# Patient Record
Sex: FEMALE | Race: WHITE | NOT HISPANIC OR LATINO | Employment: UNEMPLOYED | URBAN - METROPOLITAN AREA
[De-identification: names, ages, dates, MRNs, and addresses within clinical notes are randomized per-mention and may not be internally consistent; named-entity substitution may affect disease eponyms.]

---

## 2023-10-18 DIAGNOSIS — O09.292 HX OF PRE-ECLAMPSIA IN PRIOR PREGNANCY, CURRENTLY PREGNANT, SECOND TRIMESTER: ICD-10-CM

## 2023-10-18 DIAGNOSIS — Z36.89 ENCOUNTER FOR FETAL ANATOMIC SURVEY: ICD-10-CM

## 2023-10-18 DIAGNOSIS — O09.292 SUPRVSN OF PREG W POOR REPRODCTV OR OBSTET HX, SECOND TRI: Primary | ICD-10-CM

## 2023-10-23 ENCOUNTER — PROCEDURE VISIT (OUTPATIENT)
Dept: MATERNAL FETAL MEDICINE | Facility: CLINIC | Age: 23
End: 2023-10-23
Payer: MEDICAID

## 2023-10-23 ENCOUNTER — OFFICE VISIT (OUTPATIENT)
Dept: MATERNAL FETAL MEDICINE | Facility: CLINIC | Age: 23
End: 2023-10-23
Payer: MEDICAID

## 2023-10-23 VITALS
DIASTOLIC BLOOD PRESSURE: 90 MMHG | BODY MASS INDEX: 30.63 KG/M2 | WEIGHT: 142 LBS | SYSTOLIC BLOOD PRESSURE: 135 MMHG | HEART RATE: 102 BPM | HEIGHT: 57 IN

## 2023-10-23 DIAGNOSIS — Z36.89 ENCOUNTER FOR FETAL ANATOMIC SURVEY: ICD-10-CM

## 2023-10-23 DIAGNOSIS — O09.292 SUPRVSN OF PREG W POOR REPRODCTV OR OBSTET HX, SECOND TRI: ICD-10-CM

## 2023-10-23 DIAGNOSIS — O99.212 OBESITY AFFECTING PREGNANCY IN SECOND TRIMESTER, UNSPECIFIED OBESITY TYPE: ICD-10-CM

## 2023-10-23 DIAGNOSIS — O09.292 HX OF PRE-ECLAMPSIA IN PRIOR PREGNANCY, CURRENTLY PREGNANT, SECOND TRIMESTER: ICD-10-CM

## 2023-10-23 DIAGNOSIS — O09.292 HX OF PREECLAMPSIA, PRIOR PREGNANCY, CURRENTLY PREGNANT, SECOND TRIMESTER: ICD-10-CM

## 2023-10-23 DIAGNOSIS — O09.292 HX OF INTRAUTERINE FETAL DEATH, CURRENTLY PREGNANT, SECOND TRIMESTER: ICD-10-CM

## 2023-10-23 DIAGNOSIS — O16.2 ELEVATED BLOOD PRESSURE COMPLICATING PREGNANCY IN SECOND TRIMESTER, ANTEPARTUM: ICD-10-CM

## 2023-10-23 DIAGNOSIS — O09.90 AT HIGH RISK FOR COMPLICATIONS OF INTRAUTERINE PREGNANCY (IUP): ICD-10-CM

## 2023-10-23 PROCEDURE — 3080F PR MOST RECENT DIASTOLIC BLOOD PRESSURE >= 90 MM HG: ICD-10-PCS | Mod: CPTII,S$GLB,, | Performed by: OBSTETRICS & GYNECOLOGY

## 2023-10-23 PROCEDURE — 3080F DIAST BP >= 90 MM HG: CPT | Mod: CPTII,S$GLB,, | Performed by: OBSTETRICS & GYNECOLOGY

## 2023-10-23 PROCEDURE — 76811 OB US DETAILED SNGL FETUS: CPT | Mod: S$GLB,,, | Performed by: OBSTETRICS & GYNECOLOGY

## 2023-10-23 PROCEDURE — 3008F PR BODY MASS INDEX (BMI) DOCUMENTED: ICD-10-PCS | Mod: CPTII,S$GLB,, | Performed by: OBSTETRICS & GYNECOLOGY

## 2023-10-23 PROCEDURE — 1159F PR MEDICATION LIST DOCUMENTED IN MEDICAL RECORD: ICD-10-PCS | Mod: CPTII,S$GLB,, | Performed by: OBSTETRICS & GYNECOLOGY

## 2023-10-23 PROCEDURE — 3075F SYST BP GE 130 - 139MM HG: CPT | Mod: CPTII,S$GLB,, | Performed by: OBSTETRICS & GYNECOLOGY

## 2023-10-23 PROCEDURE — 99203 PR OFFICE/OUTPT VISIT, NEW, LEVL III, 30-44 MIN: ICD-10-PCS | Mod: TH,S$GLB,, | Performed by: OBSTETRICS & GYNECOLOGY

## 2023-10-23 PROCEDURE — 3008F BODY MASS INDEX DOCD: CPT | Mod: CPTII,S$GLB,, | Performed by: OBSTETRICS & GYNECOLOGY

## 2023-10-23 PROCEDURE — 3075F PR MOST RECENT SYSTOLIC BLOOD PRESS GE 130-139MM HG: ICD-10-PCS | Mod: CPTII,S$GLB,, | Performed by: OBSTETRICS & GYNECOLOGY

## 2023-10-23 PROCEDURE — 99203 OFFICE O/P NEW LOW 30 MIN: CPT | Mod: TH,S$GLB,, | Performed by: OBSTETRICS & GYNECOLOGY

## 2023-10-23 PROCEDURE — 76811 PR US, OB FETAL EVAL & EXAM, TRANSABDOM,FIRST GESTATION: ICD-10-PCS | Mod: S$GLB,,, | Performed by: OBSTETRICS & GYNECOLOGY

## 2023-10-23 PROCEDURE — 1159F MED LIST DOCD IN RCRD: CPT | Mod: CPTII,S$GLB,, | Performed by: OBSTETRICS & GYNECOLOGY

## 2023-10-23 RX ORDER — ASPIRIN 81 MG/1
81 TABLET ORAL DAILY
COMMUNITY

## 2023-10-23 NOTE — PROGRESS NOTES
Maternal Fetal Medicine New Consult    Subjective     Patient ID: [id number]    Chief Complaint: MFM consult with US (H/o Fetal demise due to Preeclampsia.   )      HPI 23 y.o.  female  at 18w4d gestation with Estimated Date of Delivery: 3/21/24 by LMP, consistent with early US. She is sent for MFM consultation for history of fetal demise due to preeclampsia.  She is history of severe preeclampsia requiring delivery at 25 weeks via emergency .  Baby was born alive and she reports baby passed away in the NICU 10 days later.  Baby also had severe FGR, weighing 14 oz at 25 weeks.  She has increased BMI 30.7 at consult visit.  She is currently on prophylactic low-dose aspirin daily.  She denies any personal or family history of aneuploidy or anomalies. She denies any exposure to high fevers, viral rashes, illicit drugs or alcohol in this pregnancy.  She denies any leaking fluid, vaginal bleeding, contractions, decreased fetal movement. Denies headaches, visual disturbances, or epigastric pain.  Patient was accompanied by her mother Winnie.    Pregnancy complications include:   Patient Active Problem List   Diagnosis    Hx of severe preeclampsia with fetal growth restriction requiring delivery at 25 weeks with  demise, prior pregnancy, currently pregnant, second trimester    At high risk for complications of intrauterine pregnancy (IUP)    Increased BMI affecting pregnancy in second trimester    Elevated blood pressure-at 18 weeks, possible chronic hypertension, complicating pregnancy in second trimester, antepartum        Past Medical History:   Diagnosis Date    Hypertension     Gestational HTN  with Preeclampsia       Past Surgical History:   Procedure Laterality Date     SECTION         Family History   Problem Relation Age of Onset    Hypertension Paternal Grandfather     Hypertension Maternal Grandfather     Hypertension Father      labor Mother        Social History  "    Socioeconomic History    Marital status: Single   Tobacco Use    Smoking status: Never    Smokeless tobacco: Never   Substance and Sexual Activity    Alcohol use: Never    Drug use: Never    Sexual activity: Yes     Partners: Male       Current Outpatient Medications   Medication Sig Dispense Refill    aspirin (ECOTRIN) 81 MG EC tablet Take 81 mg by mouth once daily.      prenatal vit/iron fum/folic ac (PRENATAL 1+1 ORAL) Take by mouth.       No current facility-administered medications for this visit.       Review of patient's allergies indicates:   Allergen Reactions    Egg     Pcn [penicillins] Blisters       Medications:  Current Outpatient Medications   Medication Instructions    aspirin (ECOTRIN) 81 mg, Oral, Daily    prenatal vit/iron fum/folic ac (PRENATAL 1+1 ORAL) Oral       Review of Systems   12 point review of systems conducted, negative except as stated in the history of present illness. See HPI for details.      Objective     Visit Vitals  BP (!) 135/90 (BP Location: Left arm, Patient Position: Sitting, BP Method: Medium (Automatic))   Pulse 102   Ht 4' 9" (1.448 m)   Wt 64.4 kg (142 lb)   BMI 30.73 kg/m²        Physical Exam  Vitals and nursing note reviewed.   Constitutional:       General: She is not in acute distress.     Appearance: Normal appearance.   HENT:      Head: Normocephalic and atraumatic.      Nose: Nose normal. No congestion.      Mouth/Throat:      Pharynx: Oropharynx is clear.   Eyes:      General: No scleral icterus.     Pupils: Pupils are equal, round, and reactive to light.   Cardiovascular:      Rate and Rhythm: Normal rate and regular rhythm.   Pulmonary:      Effort: No respiratory distress.      Breath sounds: Normal breath sounds. No wheezing.   Abdominal:      General: Abdomen is flat.      Palpations: Abdomen is soft.      Tenderness: There is no abdominal tenderness. There is no right CVA tenderness, left CVA tenderness or guarding.      Comments: No CVA tenderness " gravid uterus.    Musculoskeletal:         General: Normal range of motion.      Cervical back: Neck supple.      Right lower leg: No edema.      Left lower leg: No edema.   Skin:     General: Skin is warm.      Findings: No bruising or rash.   Neurological:      General: No focal deficit present.      Mental Status: She is oriented to person, place, and time.      Deep Tendon Reflexes: Reflexes normal.      Comments: Normal reflexes   Psychiatric:         Mood and Affect: Mood normal.         Behavior: Behavior normal.         Thought Content: Thought content normal.         Judgment: Judgment normal.         ASSESSMENT/PLAN:     23 y.o.  female with IUP at 18w4d    Hsitory of severe preeclampsia and fetal growth restriction requiring delivery at 25 weeks with  demise, currently pregnant  I discussed with her higher risk of recurrence even at an earlier gestational age with morbidity and mortality associated with that. I advised her of the option of the potential benefit of baby aspirin to decrease risk of recurrence that outweighs potential risk associated with its use. Low-dose aspirin as preventive medication does not increase the risk for placental abruption, postpartum hemorrhage, or fetal intracranial bleeding. Low-dose aspirin (range, 60 to 150 mg/d) reduced the risk for preeclampsia by 24% in clinical trials and reduced the risk for  birth by 14% and FGR by 20%.      With her risk factors, including  preeclampsia/GHTN in a previous pregnancy BMI over 30, low socioeconomic status, patient born with low birthweight or SGA, and previous low birthweight or SGA baby, it was agreed to adjust to asa 81 mg BID, due to multiple risk factors for preeclampsia. After discussing benefits (more effective than daily) vs potential risks (less data on safety with use at delivery), and continue until 34 6/7weeks, then decrease to once daily until delivery.. Also recommend baby aspirin use in all future  pregnancies starting at 12 weeks and work on having healthy weight prior to any future pregnancy. Questions answered. Patient verbalized understanding.    Will plan to see back in about 5 weeks to recheck fetal growth and complete fetal anatomy scan.    We will also check growth every 4 weeks after that with closer fetal surveillance if evidence of preeclampsia or other concerns.      Increased BMI in pregnancy  Body mass index is 30.73 kg/m².    I discussed the risk of miscarriage in first trimester, recurrent miscarriages, congenital anomalies, hypertension, diabetes,  labor and the higher risk of  section and the higher risk of fetal demise in-utero. There is also higher risk of for excessive fetal weight and large for gestational age (LGA) fetuses. Mothers with LGA fetuses are at higher risk of prolonged labor,  delivery, shoulder dystocia and birth trauma. LGA neonates are increased risk of fetal hypoxia and intrauterine death, and are at risk to develop diabetes, obesity, metabolic syndrome, asthma and cancer later in life. She was advised of the importance of eating healthy and limiting weight gain to 11-20 lbs during the pregnancy, as optimal in this situation. I recommended low calorie, low fat diet avoiding any additional excessive weight gain. Excess weight gain would be associated with gestational hypertension, gestational diabetes and adverse  outcomes, including fetal demise in utero.    It is important to do FKC from 24 weeks till delivery.       Importance of working on losing weight after the pregnancy is over, especially before a future pregnancy was discussed. Breastfeeding may be an important tool in reducing the postpartum weight retention. Fetal risks were discussed with short term risk of fetal/ obesity and long term risk of adolescent component of metabolic syndrome.    Follow a healthy low caloric diet.      Elevated BP reading x1  BP today 135/90.   Repeat blood pressure was 120/75    Discussed that this could be situational but could reflect underlying chronic hypertension risk.  We will plan to get a baseline 24 hour urine and preeclampsia labs.  Discussed with Dr. Miller we will check on the patient within the next week to see if blood pressure is still elevated before 20 weeks.      Discussed plan of care with the patient with follow-up in 5 weeks and every 4 weeks after that.  The possibility of underlying hypertension discussed.  I order preeclampsia labs.  She will have her blood pressure rechecked with Dr. Miller with a next week.  Agreed to use aspirin 81 mg b.i.d. with multiple risk factors for preeclampsia after discussing benefits risks as mentioned above.    Follow up in about 5 weeks (around 11/27/2023) for MFM follow-up, Repeat ultrasound, Room 1 or 2, to complete anatomy scan.     No future appointments.     Patient was evaluated by LEIDY Krishnamurthy and Dr. Hastings.  Final assessment and recommendations as stated above were made by Dr. Hastings.    Components of this note were documented using voice recognition systems; and are subject to errors not corrected at proof reading.  Please contact the author for any clarifications.

## 2023-11-09 ENCOUNTER — TELEPHONE (OUTPATIENT)
Dept: MATERNAL FETAL MEDICINE | Facility: CLINIC | Age: 23
End: 2023-11-09
Payer: MEDICAID

## 2023-11-09 NOTE — TELEPHONE ENCOUNTER
----- Message from Melonie Rodriguez PA-C sent at 11/9/2023  4:50 PM CST -----  Preeclampsia labs reassuring.  Notify patient    Pt notified

## 2023-11-10 ENCOUNTER — TELEPHONE (OUTPATIENT)
Dept: MATERNAL FETAL MEDICINE | Facility: CLINIC | Age: 23
End: 2023-11-10
Payer: MEDICAID

## 2023-11-10 NOTE — TELEPHONE ENCOUNTER
----- Message from Melonie Rodriguez PA-C sent at 11/10/2023 12:54 PM CST -----  24 hour urine normal, please notify patient.    Pt notified

## 2023-11-21 DIAGNOSIS — O09.292 HX OF PREECLAMPSIA, PRIOR PREGNANCY, CURRENTLY PREGNANT, SECOND TRIMESTER: Primary | ICD-10-CM

## 2023-11-27 ENCOUNTER — PROCEDURE VISIT (OUTPATIENT)
Dept: MATERNAL FETAL MEDICINE | Facility: CLINIC | Age: 23
End: 2023-11-27
Payer: MEDICAID

## 2023-11-27 ENCOUNTER — OFFICE VISIT (OUTPATIENT)
Dept: MATERNAL FETAL MEDICINE | Facility: CLINIC | Age: 23
End: 2023-11-27
Payer: MEDICAID

## 2023-11-27 VITALS
WEIGHT: 149.19 LBS | HEART RATE: 112 BPM | HEIGHT: 57 IN | DIASTOLIC BLOOD PRESSURE: 74 MMHG | BODY MASS INDEX: 32.19 KG/M2 | SYSTOLIC BLOOD PRESSURE: 112 MMHG

## 2023-11-27 DIAGNOSIS — O16.2 ELEVATED BLOOD PRESSURE COMPLICATING PREGNANCY IN SECOND TRIMESTER, ANTEPARTUM: ICD-10-CM

## 2023-11-27 DIAGNOSIS — O26.02 EXCESSIVE WEIGHT GAIN IN PREGNANCY IN SECOND TRIMESTER: ICD-10-CM

## 2023-11-27 DIAGNOSIS — O09.90 AT HIGH RISK FOR COMPLICATIONS OF INTRAUTERINE PREGNANCY (IUP): Primary | ICD-10-CM

## 2023-11-27 DIAGNOSIS — O09.292 HX OF PREECLAMPSIA, PRIOR PREGNANCY, CURRENTLY PREGNANT, SECOND TRIMESTER: ICD-10-CM

## 2023-11-27 DIAGNOSIS — O99.212 OBESITY AFFECTING PREGNANCY IN SECOND TRIMESTER, UNSPECIFIED OBESITY TYPE: ICD-10-CM

## 2023-11-27 PROCEDURE — 1159F PR MEDICATION LIST DOCUMENTED IN MEDICAL RECORD: ICD-10-PCS | Mod: CPTII,S$GLB,, | Performed by: OBSTETRICS & GYNECOLOGY

## 2023-11-27 PROCEDURE — 3074F PR MOST RECENT SYSTOLIC BLOOD PRESSURE < 130 MM HG: ICD-10-PCS | Mod: CPTII,S$GLB,, | Performed by: OBSTETRICS & GYNECOLOGY

## 2023-11-27 PROCEDURE — 3008F BODY MASS INDEX DOCD: CPT | Mod: CPTII,S$GLB,, | Performed by: OBSTETRICS & GYNECOLOGY

## 2023-11-27 PROCEDURE — 3078F PR MOST RECENT DIASTOLIC BLOOD PRESSURE < 80 MM HG: ICD-10-PCS | Mod: CPTII,S$GLB,, | Performed by: OBSTETRICS & GYNECOLOGY

## 2023-11-27 PROCEDURE — 1159F MED LIST DOCD IN RCRD: CPT | Mod: CPTII,S$GLB,, | Performed by: OBSTETRICS & GYNECOLOGY

## 2023-11-27 PROCEDURE — 3078F DIAST BP <80 MM HG: CPT | Mod: CPTII,S$GLB,, | Performed by: OBSTETRICS & GYNECOLOGY

## 2023-11-27 PROCEDURE — 99213 OFFICE O/P EST LOW 20 MIN: CPT | Mod: TH,S$GLB,, | Performed by: OBSTETRICS & GYNECOLOGY

## 2023-11-27 PROCEDURE — 76816 PR  US,PREGNANT UTERUS,F/U,TRANSABD APP: ICD-10-PCS | Mod: S$GLB,,, | Performed by: OBSTETRICS & GYNECOLOGY

## 2023-11-27 PROCEDURE — 3074F SYST BP LT 130 MM HG: CPT | Mod: CPTII,S$GLB,, | Performed by: OBSTETRICS & GYNECOLOGY

## 2023-11-27 PROCEDURE — 99213 PR OFFICE/OUTPT VISIT, EST, LEVL III, 20-29 MIN: ICD-10-PCS | Mod: TH,S$GLB,, | Performed by: OBSTETRICS & GYNECOLOGY

## 2023-11-27 PROCEDURE — 76816 OB US FOLLOW-UP PER FETUS: CPT | Mod: S$GLB,,, | Performed by: OBSTETRICS & GYNECOLOGY

## 2023-11-27 PROCEDURE — 3008F PR BODY MASS INDEX (BMI) DOCUMENTED: ICD-10-PCS | Mod: CPTII,S$GLB,, | Performed by: OBSTETRICS & GYNECOLOGY

## 2023-11-27 NOTE — PROGRESS NOTES
Maternal Fetal Medicine Follow Up    Subjective     Patient ID: [id number]    Chief Complaint: mfm followup w/us      HPI: Carmen Shabazz is a 23 y.o. female  at 23w4d gestation with Estimated Date of Delivery: 3/21/24  who is here for follow  up consultation by M.  She has history of severe preeclampsia requiring delivery at 25 weeks via emergency .  Baby was born alive and she reports baby passed away in the NICU 10 days later.  Baby also had severe FGR, weighing 14 oz at 25 weeks. She had an elevated BP reading at consult visit at 18 weeks, possible underlying hypertension.  Out of caution, preeclampsia labs were ordered. On 2023, preeclampsia labs were reassuring with platelets 301, uric acid 3.0, creatinine 0.48, AST 11, ALT 9, , 24 hour urine with 78 mg protein.  She has increased BMI 30.7 at consult visit.  She is currently on prophylactic low-dose aspirin twice daily.          Interval history since last M visit: None.. She denies any leaking fluid, vaginal bleeding, contractions, decreased fetal movement. Denies headaches, visual disturbances, or epigastric pain.    Pregnancy complications include:   Patient Active Problem List   Diagnosis    Hx of severe preeclampsia with fetal growth restriction requiring delivery at 25 weeks with  demise, prior pregnancy, currently pregnant, second trimester    At high risk for complications of intrauterine pregnancy (IUP)    Increased BMI affecting pregnancy in second trimester    Elevated blood pressure-at 18 weeks, possible chronic hypertension, complicating pregnancy in second trimester, antepartum        No changes to medical, surgical, family, social, or obstetric history.    Medications:  Current Outpatient Medications   Medication Instructions    aspirin (ECOTRIN) 81 mg, Oral, Daily    prenatal vit/iron fum/folic ac (PRENATAL 1+1 ORAL) Oral    prenatal vit/iron fum/folic ac (PRENATAL 1+1 ORAL) Oral       Review of Systems  "  12 point review of systems conducted, negative except as stated in the history of present illness. See HPI for details.      Objective     Visit Vitals  /74 (BP Location: Left arm, Patient Position: Sitting, BP Method: Large (Automatic))   Pulse (!) 112   Ht 4' 9" (1.448 m)   Wt 67.7 kg (149 lb 3.2 oz)   BMI 32.29 kg/m²        Physical Exam  Vitals and nursing note reviewed.   Constitutional:       Appearance: Normal appearance.      Comments: Increased BMI   HENT:      Head: Normocephalic and atraumatic.      Nose: Nose normal. No congestion.   Pulmonary:      Effort: Pulmonary effort is normal.   Skin:     Findings: No rash.   Neurological:      Mental Status: She is alert and oriented to person, place, and time.   Psychiatric:         Mood and Affect: Mood normal.         Behavior: Behavior normal.         Thought Content: Thought content normal.         Judgment: Judgment normal.           ASSESSMENT/PLAN:     23 y.o.  female with IUP at 23w4d    Hsitory of severe preeclampsia and fetal growth restriction requiring delivery at 25 weeks with  demise, currently pregnant  There is normal fetal growth with an EFW of 568 g at the 30% and the AC at the 57% on 2023.  AFV is normal.     With increased risk for recurrence, it was agreed to continue asa 81 mg BID until 34 6/7 weeks, then decrease to once daily until delivery.. Preeclampsia precautions reviewed.    We will also check growth every 4 weeks with closer fetal surveillance if evidence of preeclampsia or other concerns.      Increased BMI in pregnancy with a excessive weight gain  Body mass index is 32.29 kg/m². With excessive weight gain from last visit, 7 lbs in about 5 weeks , she was advised to decrease caloric intake and was reminded of the importance of avoiding excessive weight gain.  Excess weight gain would be associated with gestational hypertension, gestational diabetes and adverse  outcomes, including fetal " demise in utero.    With risk factors associated with increased BMI, she is to do fetal kick counts throughout the pregnancy (after 24 weeks).    It is important to lose weight after the pregnancy is over, especially before a future pregnancy was discussed. Breastfeeding may be an important tool in reducing the postpartum weight retention. Fetal risks were discussed with short term risk of fetal/ obesity and long term risk of adolescent component of metabolic syndrome.       Elevated BP reading x1 at 18 weeks  BP today 112/74.      Discussed that this could be situational but could reflect underlying chronic hypertension risk.  Baseline 24 hour urine and preeclampsia labs were reassuring. Advised to follow low sodium diet avoid excessive weight gain in pregnancy.    Follow up in about 4 weeks (around 2023) for MFM follow-up, Repeat ultrasound.     No future appointments.    Patient has gained 7 lb in about 5 weeks.  Discussed the importance of healthy weight gain with the association of excessive weight gain with preeclampsia risks and other adverse pregnancy outcomes.  Fetal growth is normal at this gestational age.  Will plan to see her in 4 weeks.  Continue aspirin b.i.d..       ANA MARIA involvement: Patient was evaluated and examined by Dr. Hastings. LEIDY Krishnamurthy, helped in pre charting of part of note.    Components of this note were documented using voice recognition systems and are subject to errors not corrected at proofreading. Please contact the author for any clarifications.

## 2023-12-22 DIAGNOSIS — O09.292 HX OF INTRAUTERINE FETAL DEATH, CURRENTLY PREGNANT, SECOND TRIMESTER: Primary | ICD-10-CM

## 2023-12-27 ENCOUNTER — PROCEDURE VISIT (OUTPATIENT)
Dept: MATERNAL FETAL MEDICINE | Facility: CLINIC | Age: 23
End: 2023-12-27
Payer: MEDICAID

## 2023-12-27 ENCOUNTER — OFFICE VISIT (OUTPATIENT)
Dept: MATERNAL FETAL MEDICINE | Facility: CLINIC | Age: 23
End: 2023-12-27
Payer: MEDICAID

## 2023-12-27 VITALS
BODY MASS INDEX: 33.01 KG/M2 | DIASTOLIC BLOOD PRESSURE: 91 MMHG | SYSTOLIC BLOOD PRESSURE: 126 MMHG | HEIGHT: 57 IN | HEART RATE: 105 BPM | WEIGHT: 153 LBS

## 2023-12-27 DIAGNOSIS — O26.02 EXCESSIVE WEIGHT GAIN IN PREGNANCY IN SECOND TRIMESTER: ICD-10-CM

## 2023-12-27 DIAGNOSIS — O99.212 OBESITY AFFECTING PREGNANCY IN SECOND TRIMESTER, UNSPECIFIED OBESITY TYPE: ICD-10-CM

## 2023-12-27 DIAGNOSIS — O09.292 HX OF PREECLAMPSIA, PRIOR PREGNANCY, CURRENTLY PREGNANT, SECOND TRIMESTER: ICD-10-CM

## 2023-12-27 DIAGNOSIS — O09.292 HX OF INTRAUTERINE FETAL DEATH, CURRENTLY PREGNANT, SECOND TRIMESTER: ICD-10-CM

## 2023-12-27 DIAGNOSIS — O09.90 AT HIGH RISK FOR COMPLICATIONS OF INTRAUTERINE PREGNANCY (IUP): Primary | ICD-10-CM

## 2023-12-27 DIAGNOSIS — O10.012 PRE-EXISTING ESSENTIAL HYPERTENSION COMPLICATING PREGNANCY IN SECOND TRIMESTER: ICD-10-CM

## 2023-12-27 PROCEDURE — 76816 OB US FOLLOW-UP PER FETUS: CPT | Mod: S$GLB,,, | Performed by: OBSTETRICS & GYNECOLOGY

## 2023-12-27 PROCEDURE — 3008F BODY MASS INDEX DOCD: CPT | Mod: CPTII,S$GLB,, | Performed by: OBSTETRICS & GYNECOLOGY

## 2023-12-27 PROCEDURE — 1159F MED LIST DOCD IN RCRD: CPT | Mod: CPTII,S$GLB,, | Performed by: OBSTETRICS & GYNECOLOGY

## 2023-12-27 PROCEDURE — 3080F DIAST BP >= 90 MM HG: CPT | Mod: CPTII,S$GLB,, | Performed by: OBSTETRICS & GYNECOLOGY

## 2023-12-27 PROCEDURE — 76816 PR  US,PREGNANT UTERUS,F/U,TRANSABD APP: ICD-10-PCS | Mod: S$GLB,,, | Performed by: OBSTETRICS & GYNECOLOGY

## 2023-12-27 PROCEDURE — 99213 PR OFFICE/OUTPT VISIT, EST, LEVL III, 20-29 MIN: ICD-10-PCS | Mod: TH,S$GLB,, | Performed by: OBSTETRICS & GYNECOLOGY

## 2023-12-27 PROCEDURE — 3074F PR MOST RECENT SYSTOLIC BLOOD PRESSURE < 130 MM HG: ICD-10-PCS | Mod: CPTII,S$GLB,, | Performed by: OBSTETRICS & GYNECOLOGY

## 2023-12-27 PROCEDURE — 3074F SYST BP LT 130 MM HG: CPT | Mod: CPTII,S$GLB,, | Performed by: OBSTETRICS & GYNECOLOGY

## 2023-12-27 PROCEDURE — 99213 OFFICE O/P EST LOW 20 MIN: CPT | Mod: TH,S$GLB,, | Performed by: OBSTETRICS & GYNECOLOGY

## 2023-12-27 PROCEDURE — 3008F PR BODY MASS INDEX (BMI) DOCUMENTED: ICD-10-PCS | Mod: CPTII,S$GLB,, | Performed by: OBSTETRICS & GYNECOLOGY

## 2023-12-27 PROCEDURE — 3080F PR MOST RECENT DIASTOLIC BLOOD PRESSURE >= 90 MM HG: ICD-10-PCS | Mod: CPTII,S$GLB,, | Performed by: OBSTETRICS & GYNECOLOGY

## 2023-12-27 PROCEDURE — 1159F PR MEDICATION LIST DOCUMENTED IN MEDICAL RECORD: ICD-10-PCS | Mod: CPTII,S$GLB,, | Performed by: OBSTETRICS & GYNECOLOGY

## 2023-12-27 RX ORDER — NAPROXEN SODIUM 220 MG/1
81 TABLET, FILM COATED ORAL
COMMUNITY
Start: 2023-12-09

## 2024-01-22 DIAGNOSIS — O10.913 PRE-EXISTING HYPERTENSION AFFECTING PREGNANCY IN THIRD TRIMESTER: Primary | ICD-10-CM

## 2024-01-24 ENCOUNTER — PROCEDURE VISIT (OUTPATIENT)
Dept: MATERNAL FETAL MEDICINE | Facility: CLINIC | Age: 24
End: 2024-01-24
Payer: MEDICAID

## 2024-01-24 ENCOUNTER — OFFICE VISIT (OUTPATIENT)
Dept: MATERNAL FETAL MEDICINE | Facility: CLINIC | Age: 24
End: 2024-01-24
Payer: MEDICAID

## 2024-01-24 VITALS
DIASTOLIC BLOOD PRESSURE: 95 MMHG | HEART RATE: 119 BPM | HEIGHT: 57 IN | WEIGHT: 157 LBS | SYSTOLIC BLOOD PRESSURE: 120 MMHG | BODY MASS INDEX: 33.87 KG/M2

## 2024-01-24 DIAGNOSIS — O10.013 PRE-EXISTING ESSENTIAL HYPERTENSION AFFECTING PREGNANCY IN THIRD TRIMESTER: ICD-10-CM

## 2024-01-24 DIAGNOSIS — O26.03 EXCESSIVE WEIGHT GAIN DURING PREGNANCY IN THIRD TRIMESTER: ICD-10-CM

## 2024-01-24 DIAGNOSIS — O09.299 HX OF PREECLAMPSIA, PRIOR PREGNANCY, CURRENTLY PREGNANT: ICD-10-CM

## 2024-01-24 DIAGNOSIS — O10.913 PRE-EXISTING HYPERTENSION AFFECTING PREGNANCY IN THIRD TRIMESTER: ICD-10-CM

## 2024-01-24 DIAGNOSIS — O09.90 AT HIGH RISK FOR COMPLICATIONS OF INTRAUTERINE PREGNANCY (IUP): Primary | ICD-10-CM

## 2024-01-24 DIAGNOSIS — O99.213 OBESITY AFFECTING PREGNANCY IN THIRD TRIMESTER, UNSPECIFIED OBESITY TYPE: ICD-10-CM

## 2024-01-24 PROCEDURE — 1159F MED LIST DOCD IN RCRD: CPT | Mod: CPTII,S$GLB,, | Performed by: OBSTETRICS & GYNECOLOGY

## 2024-01-24 PROCEDURE — 3074F SYST BP LT 130 MM HG: CPT | Mod: CPTII,S$GLB,, | Performed by: OBSTETRICS & GYNECOLOGY

## 2024-01-24 PROCEDURE — 3008F BODY MASS INDEX DOCD: CPT | Mod: CPTII,S$GLB,, | Performed by: OBSTETRICS & GYNECOLOGY

## 2024-01-24 PROCEDURE — 76816 OB US FOLLOW-UP PER FETUS: CPT | Mod: S$GLB,,, | Performed by: OBSTETRICS & GYNECOLOGY

## 2024-01-24 PROCEDURE — 99214 OFFICE O/P EST MOD 30 MIN: CPT | Mod: TH,S$GLB,, | Performed by: OBSTETRICS & GYNECOLOGY

## 2024-01-24 PROCEDURE — 76819 FETAL BIOPHYS PROFIL W/O NST: CPT | Mod: S$GLB,,, | Performed by: OBSTETRICS & GYNECOLOGY

## 2024-01-24 PROCEDURE — 3080F DIAST BP >= 90 MM HG: CPT | Mod: CPTII,S$GLB,, | Performed by: OBSTETRICS & GYNECOLOGY

## 2024-01-24 RX ORDER — LABETALOL 100 MG/1
100 TABLET, FILM COATED ORAL 2 TIMES DAILY
Qty: 60 TABLET | Refills: 2 | Status: SHIPPED | OUTPATIENT
Start: 2024-01-24 | End: 2024-04-23

## 2024-01-24 NOTE — PROGRESS NOTES
Maternal Fetal Medicine Follow Up    Subjective     Patient ID: [id number]    Chief Complaint: MFM follow  up with US (Patient is having sinus headaches.)      HPI: Carmen Shabazz is a 23 y.o. female  at 31w6d gestation with Estimated Date of Delivery: 3/21/24  who is here for follow  up consultation by M.  She has history of severe preeclampsia requiring delivery at 25 weeks via emergency .  Baby was born alive and she reports baby passed away in the NICU 10 days later.  Baby also had severe FGR, weighing 14 oz at 25 weeks. She had an initialy elevated BP reading at consult visit at 18 weeks, in multiple elevated blood pressure readings throughout the pregnancy, consistent with underlying hypertension.  She has not currently on any medication.  Out of caution, preeclampsia labs were ordered. On 2023, preeclampsia labs were reassuring with platelets 301, uric acid 3.0, creatinine 0.48, AST 11, ALT 9, , 24 hour urine with 78 mg protein.   She has increased BMI 30.7 at consult visit.  She is currently on prophylactic low-dose aspirin twice daily.          Interval history since last MFM visit: None.. She denies any leaking fluid, vaginal bleeding, contractions, decreased fetal movement. Denies headaches, visual disturbances, or epigastric pain.    Pregnancy complications include:   Patient Active Problem List   Diagnosis    Hx of severe preeclampsia with fetal growth restriction requiring delivery at 25 weeks with  demise, prior pregnancy, currently pregnant    At high risk for complications of intrauterine pregnancy (IUP)    Increased BMI affecting pregnancy in third trimester    Pre-existing essential hypertension affecting pregnancy in third trimester    Excessive weight gain during pregnancy in third trimester        No changes to medical, surgical, family, social, or obstetric history.    Medications:  Current Outpatient Medications   Medication Instructions    aspirin  "(ECOTRIN) 81 mg, Oral, Daily    aspirin 81 mg, Oral    labetaloL (NORMODYNE) 100 mg, Oral, 2 times daily    prenatal vit/iron fum/folic ac (PRENATAL 1+1 ORAL) Oral    prenatal vit/iron fum/folic ac (PRENATAL 1+1 ORAL) Oral       Review of Systems   12 point review of systems conducted, negative except as stated in the history of present illness. See HPI for details.      Objective     Visit Vitals  BP (!) 120/95 (BP Location: Right arm, Patient Position: Sitting, BP Method: Medium (Automatic))   Pulse (!) 119   Ht 4' 9" (1.448 m)   Wt 71.2 kg (157 lb)   BMI 33.97 kg/m²            Physical Exam  Vitals and nursing note reviewed.   Constitutional:       Appearance: Normal appearance.      Comments: Increased BMI   HENT:      Head: Normocephalic and atraumatic.      Nose: Nose normal. No congestion.   Pulmonary:      Effort: Pulmonary effort is normal.   Skin:     Findings: No rash.   Neurological:      Mental Status: She is alert and oriented to person, place, and time.   Psychiatric:         Mood and Affect: Mood normal.         Behavior: Behavior normal.         Thought Content: Thought content normal.         Judgment: Judgment normal.           ASSESSMENT/PLAN:     23 y.o.  female with IUP at 31w6d    History of severe preeclampsia and fetal growth restriction requiring delivery at 25 weeks with  demise, currently pregnant  There is normal fetal growth with an EFW of 1794 g at the 25% and the AC at the 64% on 2024  AFV is normal.  BPP 8/8.    With increased risk for recurrence, it was agreed to continue asa 81 mg BID until 34 6/7 weeks, then decrease to once daily until delivery.. Preeclampsia precautions reviewed.    We will also check growth every 4 weeks with closer fetal surveillance if evidence of preeclampsia or other concerns.      Increased BMI in pregnancy with continued excessive weight gain  Body mass index is 33.97 kg/m². With 4 lb gain in 1 month, she was advised to continue " healthy and low caloric diet avoiding further excessive weight gain.  Excess weight gain would be associated with gestational hypertension, gestational diabetes and adverse  outcomes, including fetal demise in utero.    With risk factors associated with increased BMI, she is to do fetal kick counts throughout the pregnancy (after 24 weeks).    It is important to lose weight after the pregnancy is over, especially before a future pregnancy was discussed. Breastfeeding may be an important tool in reducing the postpartum weight retention. Fetal risks were discussed with short term risk of fetal/ obesity and long term risk of adolescent component of metabolic syndrome.       Chronic hypertension in pregnancy  She had elevated BP at 18 weeks, and several additional elevated BP readings following that.  With these blood pressure elevations and a uric acid of 3.0, this is consistent with the initial suspected diagnosis of chronic hypertension.    Chronic hypertension complicates up to 2-5% of pregnancies and is associated with significant adverse pregnancy outcomes including  birth, fetal growth restriction, fetal demise, placental abruption, and  delivery.    BP Readings from Last 1 Encounters:   24 (!) 120/95     With this BP, and repeat blood pressure of 133/105, with no symptoms, she was advised to follow low sodium diet and started the patient on labetalol 100 mg q.12h..     Low dose aspirin as discussed.    She would benefit from follow-up ultrasounds to monitor growth every 4 weeks until the end of pregnancy.  With chronic hypertension on medication and poor OB history we will do twice weekly fetal testing alternating with Dr. Miller.        With chronic hypertension on medication and poor obstetric history,  recommend delivery at 38 weeks' gestation (38-38 6/7th weeks) as optimal balance between prematurity risks and risks of continued pregnancy. Earlier delivery may be needed  for worsening maternal or fetal status.      Follow up in about 4 days (around 1/28/2024) for BPP, MFM follow-up.     Future Appointments   Date Time Provider Department Center   1/29/2024  2:00 PM Tejas Hastings MD [facility]   1/29/2024  2:00 PM ROOM 3, [facility]       ANA MARIA involvement: Patient was evaluated and examined by Dr. Hastings. LEIDY Krishnamurthy, helped in pre charting of part of note.    This note was created with the assistance of [employer] voice recognition software. There may be transcription errors as a result of using this technology, however minimal. Effort has been made to assure accuracy of transcription, but any obvious errors or omissions should be clarified with the author of the document.

## 2024-01-26 DIAGNOSIS — O10.913 PRE-EXISTING HYPERTENSION AFFECTING PREGNANCY IN THIRD TRIMESTER: Primary | ICD-10-CM

## 2024-01-29 ENCOUNTER — PROCEDURE VISIT (OUTPATIENT)
Dept: MATERNAL FETAL MEDICINE | Facility: CLINIC | Age: 24
End: 2024-01-29
Payer: MEDICAID

## 2024-01-29 ENCOUNTER — OFFICE VISIT (OUTPATIENT)
Dept: MATERNAL FETAL MEDICINE | Facility: CLINIC | Age: 24
End: 2024-01-29
Payer: MEDICAID

## 2024-01-29 VITALS
DIASTOLIC BLOOD PRESSURE: 86 MMHG | HEIGHT: 57 IN | WEIGHT: 160 LBS | BODY MASS INDEX: 34.52 KG/M2 | SYSTOLIC BLOOD PRESSURE: 121 MMHG | HEART RATE: 95 BPM

## 2024-01-29 DIAGNOSIS — O09.299 HX OF PREECLAMPSIA, PRIOR PREGNANCY, CURRENTLY PREGNANT: ICD-10-CM

## 2024-01-29 DIAGNOSIS — O09.90 AT HIGH RISK FOR COMPLICATIONS OF INTRAUTERINE PREGNANCY (IUP): Primary | ICD-10-CM

## 2024-01-29 DIAGNOSIS — O40.3XX0 POLYHYDRAMNIOS IN THIRD TRIMESTER COMPLICATION, SINGLE OR UNSPECIFIED FETUS: ICD-10-CM

## 2024-01-29 DIAGNOSIS — O26.03 EXCESSIVE WEIGHT GAIN DURING PREGNANCY IN THIRD TRIMESTER: ICD-10-CM

## 2024-01-29 DIAGNOSIS — O99.213 OBESITY AFFECTING PREGNANCY IN THIRD TRIMESTER, UNSPECIFIED OBESITY TYPE: ICD-10-CM

## 2024-01-29 DIAGNOSIS — O10.013 PRE-EXISTING ESSENTIAL HYPERTENSION AFFECTING PREGNANCY IN THIRD TRIMESTER: ICD-10-CM

## 2024-01-29 DIAGNOSIS — O28.3 ABNORMAL PRENATAL ULTRASOUND: ICD-10-CM

## 2024-01-29 DIAGNOSIS — O10.913 PRE-EXISTING HYPERTENSION AFFECTING PREGNANCY IN THIRD TRIMESTER: ICD-10-CM

## 2024-01-29 PROCEDURE — 76819 FETAL BIOPHYS PROFIL W/O NST: CPT | Mod: S$GLB,,, | Performed by: OBSTETRICS & GYNECOLOGY

## 2024-01-29 PROCEDURE — 3074F SYST BP LT 130 MM HG: CPT | Mod: CPTII,S$GLB,, | Performed by: OBSTETRICS & GYNECOLOGY

## 2024-01-29 PROCEDURE — 76821 MIDDLE CEREBRAL ARTERY ECHO: CPT | Mod: S$GLB,,, | Performed by: OBSTETRICS & GYNECOLOGY

## 2024-01-29 PROCEDURE — 99214 OFFICE O/P EST MOD 30 MIN: CPT | Mod: TH,S$GLB,, | Performed by: OBSTETRICS & GYNECOLOGY

## 2024-01-29 PROCEDURE — 1159F MED LIST DOCD IN RCRD: CPT | Mod: CPTII,S$GLB,, | Performed by: OBSTETRICS & GYNECOLOGY

## 2024-01-29 PROCEDURE — 76820 UMBILICAL ARTERY ECHO: CPT | Mod: S$GLB,,, | Performed by: OBSTETRICS & GYNECOLOGY

## 2024-01-29 PROCEDURE — 3079F DIAST BP 80-89 MM HG: CPT | Mod: CPTII,S$GLB,, | Performed by: OBSTETRICS & GYNECOLOGY

## 2024-01-29 PROCEDURE — 3008F BODY MASS INDEX DOCD: CPT | Mod: CPTII,S$GLB,, | Performed by: OBSTETRICS & GYNECOLOGY

## 2024-01-29 NOTE — PROGRESS NOTES
Maternal Fetal Medicine Follow Up    Subjective     Patient ID: [id number]    Chief Complaint: MFM follow up with US (HTN.  Patient is having sinus issues.)      HPI: Carmen Shabazz is a 23 y.o. female  at 32w4d gestation with Estimated Date of Delivery: 3/21/24  who is here for follow  up consultation by MFM.  She has history of severe preeclampsia requiring delivery at 25 weeks via emergency .  Baby was born alive and she reports baby passed away in the NICU 10 days later.  Baby also had severe FGR, weighing 14 oz at 25 weeks. She had an initialy elevated BP reading at consult visit at 18 weeks, in multiple elevated blood pressure readings throughout the pregnancy, consistent with underlying hypertension.  She is on labetalol 100 mg q.12 hours.  Out of caution, preeclampsia labs were ordered. On 2023, preeclampsia labs were reassuring with platelets 301, uric acid 3.0, creatinine 0.48, AST 11, ALT 9, , 24 hour urine with 78 mg protein.   She has increased BMI 30.7 at consult visit.  She is currently on prophylactic low-dose aspirin twice daily.        Interval history since last M visit: None.. She denies any leaking fluid, vaginal bleeding, contractions, decreased fetal movement. Denies headaches, visual disturbances, or epigastric pain.    Pregnancy complications include:   Patient Active Problem List   Diagnosis    Hx of severe preeclampsia with fetal growth restriction requiring delivery at 25 weeks with  demise, prior pregnancy, currently pregnant    At high risk for complications of intrauterine pregnancy (IUP)    Increased BMI affecting pregnancy in third trimester    Pre-existing essential hypertension affecting pregnancy in third trimester    Excessive weight gain during pregnancy in third trimester    Polyhydramnios in third trimester    Elevated S/D ratio on umbilical artery Doppler on 2024        No changes to medical, surgical, family, social, or obstetric  "history.    Medications:  Current Outpatient Medications   Medication Instructions    aspirin (ECOTRIN) 81 mg, Oral, Daily    aspirin 81 mg, Oral    labetaloL (NORMODYNE) 100 mg, Oral, 2 times daily    prenatal vit/iron fum/folic ac (PRENATAL 1+1 ORAL) Oral    prenatal vit/iron fum/folic ac (PRENATAL 1+1 ORAL) Oral       Review of Systems   12 point review of systems conducted, negative except as stated in the history of present illness. See HPI for details.      Objective     Visit Vitals  /86 (BP Location: Right arm, Patient Position: Sitting, BP Method: Medium (Automatic))   Pulse 95   Ht 4' 9" (1.448 m)   Wt 72.6 kg (160 lb)   BMI 34.62 kg/m²       Physical Exam  Vitals and nursing note reviewed.   Constitutional:       Appearance: Normal appearance.      Comments: Increased BMI   HENT:      Head: Normocephalic and atraumatic.      Nose: Nose normal. No congestion.   Pulmonary:      Effort: Pulmonary effort is normal.   Skin:     Findings: No rash.   Neurological:      Mental Status: She is alert and oriented to person, place, and time.   Psychiatric:         Mood and Affect: Mood normal.         Behavior: Behavior normal.         Thought Content: Thought content normal.         Judgment: Judgment normal.         ASSESSMENT/PLAN:     23 y.o.  female with IUP at 32w4d    History of severe preeclampsia and fetal growth restriction requiring delivery at 25 weeks with  demise, currently pregnant  There is normal fetal growth with an EFW of 1794 g at the 25% and the AC at the 64% on 2024.  AFV is mildly elevated with SHAILESH 25.3 cm.  BPP 8/8.  Umbilical artery Doppler showed elevated S/D ratio at 4.18.  Normal MCA Doppler.    With increased risk for recurrence, it was agreed to continue asa 81 mg BID until 34 6/7 weeks, then decrease to once daily until delivery.. Preeclampsia precautions reviewed.    We will also check growth every 4 weeks with closer fetal surveillance if evidence of " preeclampsia or other concerns.      Mild polyhydramnios  I have discussed with her that in the setting of mild polyhydramnios (SHAILESH up to 30) with no anomalies seen, there is up to 1% chance of anomalies not being seen with the ultrasound as well as 1% chance of aneuploidy and 3.8% risk of abnormal microarray, as well as risk of certain infections like parvovirus, cytomegalovirus or syphilis. The latter 2 usually have other us findings on ultrasound that may warrant checking for those infections. The risk of anomalies noted at birth and not seen antenatally could be up to 10% with severe polyhydramnios (SHAILESH >35).  The options of doing an amniocentesis to assess chromosomes were discussed.  The benefits and risks were discussed. She declined genetic amniocentesis and the need for  evaluation was emphasized.     She was counseled on Cell free DNA understanding that it is an enhanced screening test but not a diagnostic test. It assesses risk for common aneuploidies such as Trisomy 13, 18, and 21 by evaluating cell-free fetal DNA in maternal circulation with a false positive rate less than 0.5% for Trisomy 21 and less reliable for Trisomy 13 and Trisomy 18. She declined cfDNA      Polyhydramnios can occur if defective swallowing such as with micrognathia, CNS abnormalities, GI abnormalities, cardiac or other anomalies not seen and or chromosomal abnormalities, that increase  morbidities and mortality.    With moderate to severe polyhydramnios, there are significantly higher risks for adverse outcomes, including fetal demise in utero, risk of rupture membranes with cord prolapse and/or placental abruption, as well as higher risk of aneuploidy, anomalies, infant death within the first year of life, as well as higher risk of cerebral palsy later in childhood, up to 10%. However, expectant management is recommended at this time, as risk of prematurity outweigh risk of continued pregnancy, knowing that there  are potential grave risks with both waiting and delivering. Patient is in agreement with waiting at this time.     With fetal anemia being a differential for polyhydramnios, a MCA doppler was done as a screening for fetal anemia. Normal MCA doppler with no evidence of and low suspicion for fetal anemia, no further MCA assessment warranted at this time.     If polyhydramnios is significantly worse or she becomes symptomatic, then amnioreduction for symptomatic relief may be done.  Amnioreduction may be associated with  delivery (within 48 hrs of procedure) in about 4% of patients, but this could be natural rate in a high risk of  delivery setting. PROM was associated with 1% of procedures in one study, but that also may be related to the condition itself than the procedure. At this time, she has minimal symptoms from that and expectant management needs to be done.     The higher-risk  labor in this setting of polyhydramnios was discussed. She was advised to be attentive to any symptoms increase cramps, vaginal discharge, and spotting if they happen to go to be checked very early.   If no new risk factors and stable SHAILESH, recommend delivery at 39 weeks gestation.        Increased BMI in pregnancy with continued excessive weight gain  Body mass index is 34.62 kg/m². With 7 lb gain in 1 month, she was advised to continue healthy and low caloric diet avoiding further excessive weight gain.  Excess weight gain would be associated with gestational hypertension, gestational diabetes and adverse  outcomes, including fetal demise in utero.    With risk factors associated with increased BMI, she is to do fetal kick counts throughout the pregnancy (after 24 weeks).    It is important to lose weight after the pregnancy is over, especially before a future pregnancy was discussed. Breastfeeding may be an important tool in reducing the postpartum weight retention. Fetal risks were discussed with short  term risk of fetal/ obesity and long term risk of adolescent component of metabolic syndrome.       Chronic hypertension in pregnancy  She had elevated BP at 18 weeks, and several additional elevated BP readings following that.  With these blood pressure elevations and a uric acid of 3.0, this is consistent with the initial suspected diagnosis of chronic hypertension.    Chronic hypertension complicates up to 2-5% of pregnancies and is associated with significant adverse pregnancy outcomes including  birth, fetal growth restriction, fetal demise, placental abruption, and  delivery.    BP Readings from Last 1 Encounters:   24 121/86     With this BP,  she was advised to follow low sodium diet and continue labetalol 100 mg q.12h..     Low dose aspirin as discussed.    She would benefit from follow-up ultrasounds to monitor growth every 4 weeks until the end of pregnancy.  With chronic hypertension on medication and poor OB history we will do twice weekly fetal testing alternating with Dr. Miller.    With chronic hypertension on medication and poor obstetric history,  recommend delivery at 38 weeks' gestation (38-38 6/7th weeks) as optimal balance between prematurity risks and risks of continued pregnancy. Earlier delivery may be needed for worsening maternal or fetal status.      Blood pressure is stable with no adjustment done on the dose of medication.  The mild polyhydramnios causes discussed.   labor instructions.  Expectant management will be done.  MCA Doppler was normal.  Umbilical artery Doppler was mildly elevated of unclear significance in the absence of fetal growth restriction.  However with chronic hypertension and past history of severe fetal growth restriction it could be that the previous ultrasound was a false negative result with the potentially more significant fetal growth restriction and will plan to continue twice weekly fetal testing with repeat umbilical artery  Doppler next week.  We will do an earlier ultrasound checked for growth in another 2 weeks.  Delivery is not indicated however nor is steroids needed as delivery is unlikely within a week's time.      Follow up in about 1 week (around 2/5/2024) for [facility] follow-up, BPP, UAD.     Future Appointments   Date Time Provider Department Center   2/5/2024  1:00 PM Tejas Hastings MD [facility]   2/5/2024  1[facility]         ANA MARIA involvement: Patient was evaluated and examined by Dr. Hastings. LEIDY Krishnamurthy, helped in pre charting of part of note.    This note was created with the assistance of [employer] voice recognition software. There may be transcription errors as a result of using this technology, however minimal. Effort has been made to assure accuracy of transcription, but any obvious errors or omissions should be clarified with the author of the document.

## 2024-02-02 DIAGNOSIS — O26.03 EXCESSIVE WEIGHT GAIN DURING PREGNANCY IN THIRD TRIMESTER: ICD-10-CM

## 2024-02-02 DIAGNOSIS — O10.013 PRE-EXISTING ESSENTIAL HYPERTENSION AFFECTING PREGNANCY IN THIRD TRIMESTER: ICD-10-CM

## 2024-02-02 DIAGNOSIS — O09.299 HX OF PREECLAMPSIA, PRIOR PREGNANCY, CURRENTLY PREGNANT: ICD-10-CM

## 2024-02-02 DIAGNOSIS — O09.90 AT HIGH RISK FOR COMPLICATIONS OF INTRAUTERINE PREGNANCY (IUP): Primary | ICD-10-CM

## 2024-02-05 ENCOUNTER — PROCEDURE VISIT (OUTPATIENT)
Dept: MATERNAL FETAL MEDICINE | Facility: CLINIC | Age: 24
End: 2024-02-05
Payer: MEDICAID

## 2024-02-05 ENCOUNTER — OFFICE VISIT (OUTPATIENT)
Dept: MATERNAL FETAL MEDICINE | Facility: CLINIC | Age: 24
End: 2024-02-05
Payer: MEDICAID

## 2024-02-05 VITALS
SYSTOLIC BLOOD PRESSURE: 116 MMHG | HEIGHT: 57 IN | WEIGHT: 161 LBS | BODY MASS INDEX: 34.73 KG/M2 | HEART RATE: 81 BPM | DIASTOLIC BLOOD PRESSURE: 87 MMHG

## 2024-02-05 DIAGNOSIS — O09.292 HX OF INTRAUTERINE FETAL DEATH, CURRENTLY PREGNANT, SECOND TRIMESTER: ICD-10-CM

## 2024-02-05 DIAGNOSIS — O09.299 HX OF PREECLAMPSIA, PRIOR PREGNANCY, CURRENTLY PREGNANT: ICD-10-CM

## 2024-02-05 DIAGNOSIS — O09.90 AT HIGH RISK FOR COMPLICATIONS OF INTRAUTERINE PREGNANCY (IUP): ICD-10-CM

## 2024-02-05 DIAGNOSIS — O26.03 EXCESSIVE WEIGHT GAIN DURING PREGNANCY IN THIRD TRIMESTER: ICD-10-CM

## 2024-02-05 DIAGNOSIS — O28.3 ABNORMAL PRENATAL ULTRASOUND: ICD-10-CM

## 2024-02-05 DIAGNOSIS — O10.013 PRE-EXISTING ESSENTIAL HYPERTENSION AFFECTING PREGNANCY IN THIRD TRIMESTER: ICD-10-CM

## 2024-02-05 DIAGNOSIS — O40.3XX0 POLYHYDRAMNIOS IN THIRD TRIMESTER COMPLICATION, SINGLE OR UNSPECIFIED FETUS: ICD-10-CM

## 2024-02-05 DIAGNOSIS — O99.213 OBESITY AFFECTING PREGNANCY IN THIRD TRIMESTER, UNSPECIFIED OBESITY TYPE: ICD-10-CM

## 2024-02-05 DIAGNOSIS — O26.03 EXCESSIVE WEIGHT GAIN DURING PREGNANCY IN THIRD TRIMESTER: Primary | ICD-10-CM

## 2024-02-05 DIAGNOSIS — O09.90 AT HIGH RISK FOR COMPLICATIONS OF INTRAUTERINE PREGNANCY (IUP): Primary | ICD-10-CM

## 2024-02-05 PROCEDURE — 1159F MED LIST DOCD IN RCRD: CPT | Mod: CPTII,S$GLB,, | Performed by: OBSTETRICS & GYNECOLOGY

## 2024-02-05 PROCEDURE — 76819 FETAL BIOPHYS PROFIL W/O NST: CPT | Mod: S$GLB,,, | Performed by: OBSTETRICS & GYNECOLOGY

## 2024-02-05 PROCEDURE — 3074F SYST BP LT 130 MM HG: CPT | Mod: CPTII,S$GLB,, | Performed by: OBSTETRICS & GYNECOLOGY

## 2024-02-05 PROCEDURE — 99213 OFFICE O/P EST LOW 20 MIN: CPT | Mod: TH,S$GLB,, | Performed by: OBSTETRICS & GYNECOLOGY

## 2024-02-05 PROCEDURE — 76820 UMBILICAL ARTERY ECHO: CPT | Mod: S$GLB,,, | Performed by: OBSTETRICS & GYNECOLOGY

## 2024-02-05 PROCEDURE — 3008F BODY MASS INDEX DOCD: CPT | Mod: CPTII,S$GLB,, | Performed by: OBSTETRICS & GYNECOLOGY

## 2024-02-05 PROCEDURE — 3079F DIAST BP 80-89 MM HG: CPT | Mod: CPTII,S$GLB,, | Performed by: OBSTETRICS & GYNECOLOGY

## 2024-02-05 NOTE — PROGRESS NOTES
Maternal Fetal Medicine Follow Up    Subjective     Patient ID: [id number]    Chief Complaint: [facility] follow up with US      HPI: Carmen Shabazz is a 23 y.o. female  at 33w4d gestation with Estimated Date of Delivery: 3/21/24  who is here for follow  up consultation by M.  She has history of severe preeclampsia requiring delivery at 25 weeks via emergency .  Baby was born alive and she reports baby passed away in the NICU 10 days later.  Baby also had severe FGR, weighing 14 oz at 25 weeks. She had an initialy elevated BP reading at consult visit at 18 weeks, in multiple elevated blood pressure readings throughout the pregnancy, consistent with underlying hypertension.  She is on labetalol 100 mg q.12 hours.  Out of caution, preeclampsia labs were ordered. On 2023, preeclampsia labs were reassuring with platelets 301, uric acid 3.0, creatinine 0.48, AST 11, ALT 9, , 24 hour urine with 78 mg protein.   She has increased BMI 30.7 at consult visit.  She is currently on prophylactic low-dose aspirin twice daily.  She had mild polyhydramnios seen on 2024 as well as elevated S/D ratio on umbilical artery Doppler.         Interval history since last M visit: None.. She denies any leaking fluid, vaginal bleeding, contractions, decreased fetal movement. Denies headaches, visual disturbances, or epigastric pain.    Pregnancy complications include:   Patient Active Problem List   Diagnosis    Hx of severe preeclampsia with fetal growth restriction requiring delivery at 25 weeks with  demise, prior pregnancy, currently pregnant    At high risk for complications of intrauterine pregnancy (IUP)    Increased BMI affecting pregnancy in third trimester    Pre-existing essential hypertension affecting pregnancy in third trimester    Excessive weight gain during pregnancy in third trimester    Polyhydramnios in third trimester, resolved    Elevated S/D ratio on umbilical artery Doppler x2     "    No changes to medical, surgical, family, social, or obstetric history.    Medications:  Current Outpatient Medications   Medication Instructions    aspirin (ECOTRIN) 81 mg, Oral, Daily    aspirin 81 mg, Oral    labetaloL (NORMODYNE) 100 mg, Oral, 2 times daily    prenatal vit/iron fum/folic ac (PRENATAL 1+1 ORAL) Oral    prenatal vit/iron fum/folic ac (PRENATAL 1+1 ORAL) Oral       Review of Systems   12 point review of systems conducted, negative except as stated in the history of present illness. See HPI for details.      Objective     Visit Vitals  /87 (BP Location: Right arm, Patient Position: Sitting, BP Method: Medium (Automatic))   Pulse 81   Ht 4' 9" (1.448 m)   Wt 73 kg (161 lb)   BMI 34.84 kg/m²         Physical Exam  Vitals and nursing note reviewed.   Constitutional:       Appearance: Normal appearance.      Comments: Increased BMI   HENT:      Head: Normocephalic and atraumatic.      Nose: Nose normal. No congestion.   Pulmonary:      Effort: Pulmonary effort is normal.   Skin:     Findings: No rash.   Neurological:      Mental Status: She is alert and oriented to person, place, and time.   Psychiatric:         Mood and Affect: Mood normal.         Behavior: Behavior normal.         Thought Content: Thought content normal.         Judgment: Judgment normal.         ASSESSMENT/PLAN:     23 y.o.  female with IUP at 33w4d    History of severe preeclampsia and fetal growth restriction requiring delivery at 25 weeks with  demise, currently pregnant  There is normal fetal growth with an EFW of 1794 g at the 25% and the AC at the 64% on 2024.  AFV is normal with SHAILESH 13.5 cm.  BPP 8/8.  Umbilical artery Doppler showed persistently elevated S/D ratio at 3.76.    With increased risk for recurrence, it was agreed to continue asa 81 mg BID until 34 6/7 weeks, then decrease to once daily until delivery.. Preeclampsia precautions reviewed.    We will also check growth every 4 weeks with " closer fetal surveillance if evidence of preeclampsia or other concerns.      Mild polyhydramnios, resolved  Amniotic fluid volume is normal today with SHAILESH 13.5 cm.    She declined genetic amniocentesis and the need for  evaluation was emphasized. She declined cfDNA .      Increased BMI in pregnancy with excessive weight gain  Body mass index is 34.84 kg/m². With previous 7 lb gain in 1 month, relatively stable since last visit, she was advised to continue healthy and low caloric diet avoiding further excessive weight gain.  Excess weight gain would be associated with gestational hypertension, gestational diabetes and adverse  outcomes, including fetal demise in utero.    With risk factors associated with increased BMI, she is to do fetal kick counts throughout the pregnancy (after 24 weeks).    It is important to lose weight after the pregnancy is over, especially before a future pregnancy was discussed. Breastfeeding may be an important tool in reducing the postpartum weight retention. Fetal risks were discussed with short term risk of fetal/ obesity and long term risk of adolescent component of metabolic syndrome.       Chronic hypertension in pregnancy  She had elevated BP at 18 weeks, and several additional elevated BP readings following that.  With these blood pressure elevations and a uric acid of 3.0, this is consistent with the initial suspected diagnosis of chronic hypertension.    Chronic hypertension complicates up to 2-5% of pregnancies and is associated with significant adverse pregnancy outcomes including  birth, fetal growth restriction, fetal demise, placental abruption, and  delivery.    BP Readings from Last 1 Encounters:   24 116/87     With this BP, and a another reading of 130/92 she was advised to follow low sodium diet and continue labetalol 100 mg q.12h.     Low dose aspirin as discussed.    We will plan to rechecked growth next week.  With  chronic hypertension on medication and poor OB history we will do twice weekly fetal testing alternating with Dr. Miller.    With chronic hypertension on medication and poor obstetric history,  recommend delivery at 38 weeks' gestation (38-38 6/7th weeks) as optimal balance between prematurity risks and risks of continued pregnancy. Earlier delivery may be needed for worsening maternal or fetal status.      Elevated S/D ratio on UAD on 1/29/24 and 2/5/2024  Umbilical artery Doppler was mildly elevated of unclear significance in the absence of fetal growth restriction.  However with chronic hypertension and past history of severe fetal growth restriction it could be that the previous ultrasound was a false negative result with the potentially more significant fetal growth restriction and will plan to continue twice weekly fetal testing.  We will do an earlier ultrasound checked for growth in one week.      Follow up in about 1 week (around 2/12/2024) for MFM follow-up, Repeat ultrasound, BPP, UAD.     No future appointments.    We are going to rechecked growth next week to make sure no fetal growth restriction is noted with mildly elevated S/D ratio.  Fetal kick count instructions.  Preeclampsia warnings.  Continue labetalol 100 mg q.12h.  Amniotic fluid volume is normal today.      ANA MARIA involvement: Patient was evaluated and examined by Dr. Hastings. LEIDY Krishnamurthy, helped in pre charting of part of note.    This note was created with the assistance of [employer] voice recognition software. There may be transcription errors as a result of using this technology, however minimal. Effort has been made to assure accuracy of transcription, but any obvious errors or omissions should be clarified with the author of the document.

## 2024-02-12 ENCOUNTER — OFFICE VISIT (OUTPATIENT)
Dept: MATERNAL FETAL MEDICINE | Facility: CLINIC | Age: 24
End: 2024-02-12
Payer: MEDICAID

## 2024-02-12 ENCOUNTER — PATIENT MESSAGE (OUTPATIENT)
Dept: MATERNAL FETAL MEDICINE | Facility: CLINIC | Age: 24
End: 2024-02-12

## 2024-02-12 ENCOUNTER — PROCEDURE VISIT (OUTPATIENT)
Dept: MATERNAL FETAL MEDICINE | Facility: CLINIC | Age: 24
End: 2024-02-12
Payer: MEDICAID

## 2024-02-12 VITALS
HEART RATE: 97 BPM | SYSTOLIC BLOOD PRESSURE: 133 MMHG | DIASTOLIC BLOOD PRESSURE: 89 MMHG | BODY MASS INDEX: 35.38 KG/M2 | HEIGHT: 57 IN | WEIGHT: 164 LBS

## 2024-02-12 DIAGNOSIS — O99.213 OBESITY AFFECTING PREGNANCY IN THIRD TRIMESTER, UNSPECIFIED OBESITY TYPE: ICD-10-CM

## 2024-02-12 DIAGNOSIS — O09.292 HX OF INTRAUTERINE FETAL DEATH, CURRENTLY PREGNANT, SECOND TRIMESTER: ICD-10-CM

## 2024-02-12 DIAGNOSIS — O40.3XX0 POLYHYDRAMNIOS IN THIRD TRIMESTER COMPLICATION, SINGLE OR UNSPECIFIED FETUS: ICD-10-CM

## 2024-02-12 DIAGNOSIS — O28.3 ABNORMAL PRENATAL ULTRASOUND: ICD-10-CM

## 2024-02-12 DIAGNOSIS — O09.299 HX OF PREECLAMPSIA, PRIOR PREGNANCY, CURRENTLY PREGNANT: ICD-10-CM

## 2024-02-12 DIAGNOSIS — O36.5930 POOR FETAL GROWTH AFFECTING MANAGEMENT OF MOTHER IN THIRD TRIMESTER, SINGLE OR UNSPECIFIED FETUS: ICD-10-CM

## 2024-02-12 DIAGNOSIS — O26.03 EXCESSIVE WEIGHT GAIN DURING PREGNANCY IN THIRD TRIMESTER: ICD-10-CM

## 2024-02-12 DIAGNOSIS — O09.90 AT HIGH RISK FOR COMPLICATIONS OF INTRAUTERINE PREGNANCY (IUP): Primary | ICD-10-CM

## 2024-02-12 DIAGNOSIS — O10.013 PRE-EXISTING ESSENTIAL HYPERTENSION AFFECTING PREGNANCY IN THIRD TRIMESTER: ICD-10-CM

## 2024-02-12 PROCEDURE — 3075F SYST BP GE 130 - 139MM HG: CPT | Mod: CPTII,S$GLB,, | Performed by: OBSTETRICS & GYNECOLOGY

## 2024-02-12 PROCEDURE — 76819 FETAL BIOPHYS PROFIL W/O NST: CPT | Mod: S$GLB,,, | Performed by: OBSTETRICS & GYNECOLOGY

## 2024-02-12 PROCEDURE — 1160F RVW MEDS BY RX/DR IN RCRD: CPT | Mod: CPTII,S$GLB,, | Performed by: OBSTETRICS & GYNECOLOGY

## 2024-02-12 PROCEDURE — 1159F MED LIST DOCD IN RCRD: CPT | Mod: CPTII,S$GLB,, | Performed by: OBSTETRICS & GYNECOLOGY

## 2024-02-12 PROCEDURE — 76816 OB US FOLLOW-UP PER FETUS: CPT | Mod: S$GLB,,, | Performed by: OBSTETRICS & GYNECOLOGY

## 2024-02-12 PROCEDURE — 3079F DIAST BP 80-89 MM HG: CPT | Mod: CPTII,S$GLB,, | Performed by: OBSTETRICS & GYNECOLOGY

## 2024-02-12 PROCEDURE — 99215 OFFICE O/P EST HI 40 MIN: CPT | Mod: TH,S$GLB,, | Performed by: OBSTETRICS & GYNECOLOGY

## 2024-02-12 PROCEDURE — 3008F BODY MASS INDEX DOCD: CPT | Mod: CPTII,S$GLB,, | Performed by: OBSTETRICS & GYNECOLOGY

## 2024-02-12 PROCEDURE — 76820 UMBILICAL ARTERY ECHO: CPT | Mod: S$GLB,,, | Performed by: OBSTETRICS & GYNECOLOGY

## 2024-02-12 NOTE — TELEPHONE ENCOUNTER
Tried to call patient.  No answer x2.  Sent patient a message to remind her to go down on her low-dose aspirin to once daily until delivery.

## 2024-02-15 DIAGNOSIS — O10.013 PRE-EXISTING ESSENTIAL HYPERTENSION AFFECTING PREGNANCY IN THIRD TRIMESTER: Primary | ICD-10-CM

## 2024-02-19 ENCOUNTER — PROCEDURE VISIT (OUTPATIENT)
Dept: MATERNAL FETAL MEDICINE | Facility: CLINIC | Age: 24
End: 2024-02-19
Payer: MEDICAID

## 2024-02-19 ENCOUNTER — OFFICE VISIT (OUTPATIENT)
Dept: MATERNAL FETAL MEDICINE | Facility: CLINIC | Age: 24
End: 2024-02-19
Payer: MEDICAID

## 2024-02-19 VITALS
SYSTOLIC BLOOD PRESSURE: 125 MMHG | HEIGHT: 57 IN | WEIGHT: 165.19 LBS | DIASTOLIC BLOOD PRESSURE: 87 MMHG | HEART RATE: 107 BPM | BODY MASS INDEX: 35.64 KG/M2

## 2024-02-19 DIAGNOSIS — O09.90 AT HIGH RISK FOR COMPLICATIONS OF INTRAUTERINE PREGNANCY (IUP): Primary | ICD-10-CM

## 2024-02-19 DIAGNOSIS — O10.013 PRE-EXISTING ESSENTIAL HYPERTENSION AFFECTING PREGNANCY IN THIRD TRIMESTER: ICD-10-CM

## 2024-02-19 DIAGNOSIS — O99.213 OBESITY AFFECTING PREGNANCY IN THIRD TRIMESTER, UNSPECIFIED OBESITY TYPE: ICD-10-CM

## 2024-02-19 DIAGNOSIS — O28.3 ABNORMAL PRENATAL ULTRASOUND: ICD-10-CM

## 2024-02-19 DIAGNOSIS — O36.5930 POOR FETAL GROWTH AFFECTING MANAGEMENT OF MOTHER IN THIRD TRIMESTER, SINGLE OR UNSPECIFIED FETUS: ICD-10-CM

## 2024-02-19 DIAGNOSIS — O26.03 EXCESSIVE WEIGHT GAIN DURING PREGNANCY IN THIRD TRIMESTER: ICD-10-CM

## 2024-02-19 DIAGNOSIS — O09.299 HX OF PREECLAMPSIA, PRIOR PREGNANCY, CURRENTLY PREGNANT: ICD-10-CM

## 2024-02-19 PROCEDURE — 3079F DIAST BP 80-89 MM HG: CPT | Mod: CPTII,S$GLB,, | Performed by: OBSTETRICS & GYNECOLOGY

## 2024-02-19 PROCEDURE — 76820 UMBILICAL ARTERY ECHO: CPT | Mod: S$GLB,,, | Performed by: OBSTETRICS & GYNECOLOGY

## 2024-02-19 PROCEDURE — 99213 OFFICE O/P EST LOW 20 MIN: CPT | Mod: TH,S$GLB,, | Performed by: OBSTETRICS & GYNECOLOGY

## 2024-02-19 PROCEDURE — 3008F BODY MASS INDEX DOCD: CPT | Mod: CPTII,S$GLB,, | Performed by: OBSTETRICS & GYNECOLOGY

## 2024-02-19 PROCEDURE — 1159F MED LIST DOCD IN RCRD: CPT | Mod: CPTII,S$GLB,, | Performed by: OBSTETRICS & GYNECOLOGY

## 2024-02-19 PROCEDURE — 3074F SYST BP LT 130 MM HG: CPT | Mod: CPTII,S$GLB,, | Performed by: OBSTETRICS & GYNECOLOGY

## 2024-02-19 PROCEDURE — 1160F RVW MEDS BY RX/DR IN RCRD: CPT | Mod: CPTII,S$GLB,, | Performed by: OBSTETRICS & GYNECOLOGY

## 2024-02-19 PROCEDURE — 76819 FETAL BIOPHYS PROFIL W/O NST: CPT | Mod: S$GLB,,, | Performed by: OBSTETRICS & GYNECOLOGY

## 2024-02-19 NOTE — PROGRESS NOTES
Maternal Fetal Medicine Follow Up    Subjective     Patient ID: [id number]    Chief Complaint: MFM FOLLOWUP W/US (Pt stated that Friday night she had to go to the hospital due to high blood pressure issues. It was 158/109.)      HPI: Carmen Shabazz is a 23 y.o. female  at 35w4d gestation with Estimated Date of Delivery: 3/21/24  who is here for follow  up consultation by MFM.  She has history of severe preeclampsia requiring delivery at 25 weeks via emergency .  Baby was born alive and she reports baby passed away in the NICU 10 days later.  Baby also had severe FGR, weighing 14 oz at 25 weeks. She had an initialy elevated BP reading at consult visit at 18 weeks, in multiple elevated blood pressure readings throughout the pregnancy, consistent with underlying hypertension.  She is on labetalol 100 mg q.12 hours.  Out of caution, preeclampsia labs were ordered. On 2023, preeclampsia labs were reassuring with platelets 301, uric acid 3.0, creatinine 0.48, AST 11, ALT 9, , 24 hour urine with 78 mg protein.   She has increased BMI 30.7 at consult visit.  She is currently on prophylactic low-dose aspirin daily.  She had mild polyhydramnios seen on 2024 as well as elevated S/D ratio on umbilical artery Doppler.  She had a recurrent elevation in S/D ratio on umbilical artery Doppler on 2024 that was attributed to the fetal growth restriction noted on ultrasound on 2024.  Polyhydramnios has resolved.  She is s/p Celestone x2.     Patient went to the hospital on  because of elevated blood pressure at home and blood pressures were normal at the hospital with reported labs and urine being reassuring.  Patient was accompanied by her mother.       Interval history since last [facility] visit: None.. She denies any leaking fluid, vaginal bleeding, contractions, decreased fetal movement. Denies headaches, visual disturbances, or epigastric pain.    Pregnancy complications  "include:   Patient Active Problem List   Diagnosis    Hx of severe preeclampsia with fetal growth restriction requiring delivery at 25 weeks with  demise, prior pregnancy, currently pregnant    At high risk for complications of intrauterine pregnancy (IUP)    Increased BMI affecting pregnancy in third trimester    Pre-existing essential hypertension affecting pregnancy in third trimester    Excessive weight gain during pregnancy in third trimester    Elevated S/D ratio on umbilical artery Doppler x2    Poor fetal growth affecting management of mother in third trimester        No changes to medical, surgical, family, social, or obstetric history.    Medications:  Current Outpatient Medications   Medication Instructions    aspirin (ECOTRIN) 81 mg, Oral, Daily    aspirin 81 mg, Oral    labetaloL (NORMODYNE) 100 mg, Oral, 2 times daily    prenatal vit/iron fum/folic ac (PRENATAL 1+1 ORAL) Oral    prenatal vit/iron fum/folic ac (PRENATAL 1+1 ORAL) Oral       Review of Systems   12 point review of systems conducted, negative except as stated in the history of present illness. See HPI for details.      Objective     Visit Vitals  /87 (BP Location: Right arm, Patient Position: Sitting, BP Method: Large (Automatic))   Pulse 107   Ht 4' 9" (1.448 m)   Wt 74.9 kg (165 lb 3.2 oz)   BMI 35.75 kg/m²         Physical Exam  Vitals and nursing note reviewed.   Constitutional:       Appearance: Normal appearance.      Comments: Increased BMI   HENT:      Head: Normocephalic and atraumatic.      Nose: Nose normal. No congestion.   Pulmonary:      Effort: Pulmonary effort is normal.   Skin:     Findings: No rash.   Neurological:      Mental Status: She is alert and oriented to person, place, and time.   Psychiatric:         Mood and Affect: Mood normal.         Behavior: Behavior normal.         Thought Content: Thought content normal.         Judgment: Judgment normal.         ASSESSMENT/PLAN:     23 y.o.  female " with IUP at 35w4d    History of severe preeclampsia and mild fetal growth restriction requiring delivery at 25 weeks with  demise, currently pregnant  There is mild fetal growth restriction with an EFW of 2014 g at the 8% and the AC at the 5% on 2024.  AFV is normal.  BPP 8  Umbilical artery Doppler is mildly abnormal with borderline elevated S/D ratio.    With increased risk for recurrence, advised to continue low-dose aspirin daily until delivery. Preeclampsia precautions reviewed.    Fetal surveillance as below.      Increased BMI in pregnancy with excessive weight gain  Body mass index is 35.75 kg/m². With previous 4 lb gain in 2 weeks, she was advised to continue healthy and low caloric diet avoiding further excessive weight gain.  Excess weight gain would be associated with gestational hypertension, gestational diabetes and adverse  outcomes, including fetal demise in utero.    With risk factors associated with increased BMI, she is to do fetal kick counts throughout the pregnancy (after 24 weeks).    It is important to lose weight after the pregnancy is over, especially before a future pregnancy was discussed. Breastfeeding may be an important tool in reducing the postpartum weight retention. Fetal risks were discussed with short term risk of fetal/ obesity and long term risk of adolescent component of metabolic syndrome.       Chronic hypertension in pregnancy  She had elevated BP at 18 weeks, and several additional elevated BP readings following that.  With these blood pressure elevations and a uric acid of 3.0, this is consistent with the initial suspected diagnosis of chronic hypertension.    Chronic hypertension complicates up to 2-5% of pregnancies and is associated with significant adverse pregnancy outcomes including  birth, fetal growth restriction, fetal demise, placental abruption, and  delivery.    BP Readings from Last 1 Encounters:   24 125/87      With this BP, and an additional reading of 138/100, she was advised to follow low sodium diet and continue labetalol 100 mg q.12h.     Patient will be checking her blood pressures at home and understands to come in immediately if she has blood pressure greater than 145 systolic or 95 diastolic, if she has any headaches, vision problems, epigastric pain, or decreased fetal movements less than 10 movements an hour at any time.     Low dose aspirin as discussed.    Fetal surveillance and delivery planning as below.      Mild fetal growth restriction  She declined amniocentesis . She declined cfDNA . She was advised of need for  evaluation.    Potential etiologies of FGR include but are not limited to normal variation of stature, placental insufficiency, chromosomal abnormalities, genetic disorders, infections, medical conditions, teratogen exposure and other etiologies.      Complications of growth-restricted neonates include hypoglycemia, hyperbilirubinemia, hypothermia, seizures, sepsis, IVH, RDS, necrotizing enterocolitis, and  asphyxia. Long-term complications include cognitive delay and adult diseases such as cardiovascular disorders and type 2 diabetes. There is an increased risk (~20%) for recurrence of fetal growth restriction in a future pregnancy.    We will plan to continue twice weekly fetal testing till delivery.  She was instructed that fetal testing is not a 100% protective against the risk of fetal demise in-utero. The importance of doing fetal kick counts three times a day and resting in a lateral recumbent position and reporting immediately at any time there is any decreased fetal movement even if the same day of testing was emphasized. Her questions were answered.    With chronic hypertension on medication, history of  demise in utero, and mild fetal growth restriction with elevated S/D ratio, plan delivery at no later than 36 weeks' gestation (36-36 6/7th weeks) as  optimal balance between prematurity risks and risks of continued pregnancy. Earlier delivery may be needed for worsening maternal or fetal status.     Fetal kick count instructions.  Preeclampsia warnings.  Patient is scheduled to deliver on Friday.  No adjustment on blood pressure medication.  Continue baby aspirin daily.      Follow up for None. Keep follow up with primary OB.     No future appointments.      ANA MARIA involvement: Patient was evaluated and examined by Dr. Hastings. LEIDY Krishnamurthy, helped in pre charting of part of note.    This note was created with the assistance of [employer] voice recognition software. There may be transcription errors as a result of using this technology, however minimal. Effort has been made to assure accuracy of transcription, but any obvious errors or omissions should be clarified with the author of the document.

## 2024-05-13 PROBLEM — O36.5930 POOR FETAL GROWTH AFFECTING MANAGEMENT OF MOTHER IN THIRD TRIMESTER: Status: RESOLVED | Noted: 2024-02-12 | Resolved: 2024-05-13
